# Patient Record
Sex: MALE
[De-identification: names, ages, dates, MRNs, and addresses within clinical notes are randomized per-mention and may not be internally consistent; named-entity substitution may affect disease eponyms.]

---

## 2024-06-20 ENCOUNTER — DASHBOARD ENCOUNTERS (OUTPATIENT)
Age: 29
End: 2024-06-20

## 2024-06-20 ENCOUNTER — LAB OUTSIDE AN ENCOUNTER (OUTPATIENT)
Dept: URBAN - METROPOLITAN AREA CLINIC 82 | Facility: CLINIC | Age: 29
End: 2024-06-20

## 2024-06-20 ENCOUNTER — OFFICE VISIT (OUTPATIENT)
Dept: URBAN - METROPOLITAN AREA CLINIC 82 | Facility: CLINIC | Age: 29
End: 2024-06-20

## 2024-06-20 VITALS
HEIGHT: 67 IN | WEIGHT: 114.8 LBS | TEMPERATURE: 98.4 F | HEART RATE: 81 BPM | BODY MASS INDEX: 18.02 KG/M2 | DIASTOLIC BLOOD PRESSURE: 62 MMHG | SYSTOLIC BLOOD PRESSURE: 95 MMHG

## 2024-06-20 PROBLEM — 56689002: Status: ACTIVE | Noted: 2024-06-20

## 2024-06-20 NOTE — HPI-TODAY'S VISIT:
29 y/o white male presents to establish care for Crohn's disease. First diagnosed age 9.  Fever, diarhrea, pain were initial symptoms.   Sulfasalazine. Remicade 6 years, transition to humira due to insurance and convenience. Humira X 6 yeras then     Was on for 6 years until insurance lapsed.  Now having diarrhea daily for past 6 months.  Watery loose stools 3 or 4 times per day.  Abdominal pain before diarrhea. Lost over 20 pounds in past 6 months,  Eating normally.   Last colonoscopy 6 years ago, was in remission. Always small intestine.  Thalemsemia trait, never an issue.

## 2024-06-25 LAB
A/G RATIO: 1.2
ALBUMIN: 3.2
ALKALINE PHOSPHATASE: 63
ALT (SGPT): 9
AST (SGOT): 11
BILIRUBIN, TOTAL: 0.3
BUN/CREATININE RATIO: (no result)
BUN: 9
C-REACTIVE PROTEIN, QUANT: 114
CALCIUM: 8.6
CARBON DIOXIDE, TOTAL: 27
CHLORIDE: 102
CREATININE: 0.79
EGFR: 126
GLOBULIN, TOTAL: 2.7
GLUCOSE: 83
HEMATOCRIT: 40.5
HEMOGLOBIN: 13.2
MCH: 26.5
MCHC: 32.6
MCV: 81.2
MITOGEN-NIL: 5.33
MPV: 8.3
PLATELET COUNT: 398
POTASSIUM: 4.4
PROTEIN, TOTAL: 5.9
QUANTIFERON NIL VALUE: 0.13
QUANTIFERON TB1 AG VALUE: 0.01
QUANTIFERON TB2 AG VALUE: 0
QUANTIFERON-TB GOLD PLUS: NEGATIVE
RDW: 12.3
RED BLOOD CELL COUNT: 4.99
SODIUM: 139
WHITE BLOOD CELL COUNT: 6.2

## 2024-07-11 ENCOUNTER — LAB OUTSIDE AN ENCOUNTER (OUTPATIENT)
Dept: URBAN - METROPOLITAN AREA CLINIC 115 | Facility: CLINIC | Age: 29
End: 2024-07-11

## 2024-07-14 LAB
ADENOVIRUS F 40/41: NOT DETECTED
CALPROTECTIN, STOOL - QDX: (no result)
CAMPYLOBACTER: NOT DETECTED
CLOSTRIDIUM DIFFICILE: NOT DETECTED
ENTAMOEBA HISTOLYTICA: NOT DETECTED
ENTEROAGGREGATIVE E.COLI: NOT DETECTED
ENTEROTOXIGENIC E.COLI: NOT DETECTED
GIARDIA LAMBLIA: NOT DETECTED
NOROVIRUS GI/GII: NOT DETECTED
ROTAVIRUS A: NOT DETECTED
SHIGA-LIKE TOXIN PRODUCING E.COLI: NOT DETECTED
SHIGA-LIKE TOXIN PRODUCING E.COLI: NOT DETECTED
VIBRIO CHOLERAE: NOT DETECTED
VIBRIO SPP.: NOT DETECTED
YERSINIA ENTEROCOLITICA: NOT DETECTED
YERSINIA ENTEROCOLITICA: NOT DETECTED

## 2024-07-23 ENCOUNTER — OFFICE VISIT (OUTPATIENT)
Dept: URBAN - METROPOLITAN AREA SURGERY CENTER 13 | Facility: SURGERY CENTER | Age: 29
End: 2024-07-23

## 2024-07-25 ENCOUNTER — CLAIMS CREATED FROM THE CLAIM WINDOW (OUTPATIENT)
Dept: URBAN - METROPOLITAN AREA CLINIC 4 | Facility: CLINIC | Age: 29
End: 2024-07-25
Payer: COMMERCIAL

## 2024-07-25 ENCOUNTER — CLAIMS CREATED FROM THE CLAIM WINDOW (OUTPATIENT)
Dept: URBAN - METROPOLITAN AREA SURGERY CENTER 13 | Facility: SURGERY CENTER | Age: 29
End: 2024-07-25
Payer: COMMERCIAL

## 2024-07-25 ENCOUNTER — OFFICE VISIT (OUTPATIENT)
Dept: URBAN - METROPOLITAN AREA SURGERY CENTER 13 | Facility: SURGERY CENTER | Age: 29
End: 2024-07-25

## 2024-07-25 DIAGNOSIS — K52.89 OTHER SPECIFIED NONINFECTIVE GASTROENTERITIS AND COLITIS: ICD-10-CM

## 2024-07-25 DIAGNOSIS — Z86.010 ADENOMAS PERSONAL HISTORY OF COLONIC POLYPS: ICD-10-CM

## 2024-07-25 DIAGNOSIS — K31.89 OTHER DISEASES OF STOMACH AND DUODENUM: ICD-10-CM

## 2024-07-25 DIAGNOSIS — Z12.11 COLON CANCER SCREENING: ICD-10-CM

## 2024-07-25 DIAGNOSIS — K29.81 DUODENITIS WITH BLEEDING: ICD-10-CM

## 2024-07-25 PROCEDURE — 00813 ANES UPR LWR GI NDSC PX: CPT | Performed by: ANESTHESIOLOGIST ASSISTANT

## 2024-07-25 PROCEDURE — 88305 TISSUE EXAM BY PATHOLOGIST: CPT | Performed by: PATHOLOGY

## 2024-07-25 PROCEDURE — 00813 ANES UPR LWR GI NDSC PX: CPT | Performed by: ANESTHESIOLOGY

## 2024-07-25 PROCEDURE — 88312 SPECIAL STAINS GROUP 1: CPT | Performed by: PATHOLOGY

## 2024-07-31 ENCOUNTER — OFFICE VISIT (OUTPATIENT)
Dept: URBAN - METROPOLITAN AREA CLINIC 82 | Facility: CLINIC | Age: 29
End: 2024-07-31

## 2024-07-31 VITALS
BODY MASS INDEX: 18.52 KG/M2 | TEMPERATURE: 98.3 F | WEIGHT: 118 LBS | SYSTOLIC BLOOD PRESSURE: 102 MMHG | HEIGHT: 67 IN | DIASTOLIC BLOOD PRESSURE: 69 MMHG

## 2024-07-31 RX ORDER — RISANKIZUMAB-RZAA 180 MG/1.2
1.2 ML KIT SUBCUTANEOUS
Qty: 1 | Refills: 6 | OUTPATIENT
Start: 2024-07-31 | End: 2025-08-27

## 2024-07-31 RX ORDER — RISANKIZUMAB-RZAA 60 MG/ML
AS DIRECTED INJECTION INTRAVENOUS
Qty: 600 | Refills: 0 | OUTPATIENT
Start: 2024-07-31

## 2024-07-31 NOTE — HPI-TODAY'S VISIT:
27 y/o white male presents in follow up after staging for Crohn's disease. First diagnosed age 9.  Fever, diarhrea, pain were initial symptoms.  Currently having diarrhea, abdo pain and weight loss.  Sulfasalazine. Remicade 6 years, transition to humira due to insurance and convenience. Humira X 6 years then insurance lapsed.    EGD found gastritis as well as scalloping in duodenum.  Bxp pending. Colon found signficiant edema and nodular mucosa in cecum, Bxp pending.  Unabe to intubate TI.  CT found long segment of active inflammation in distal ileum, no absces or fistula.    CRP elevated.

## 2024-08-02 ENCOUNTER — TELEPHONE ENCOUNTER (OUTPATIENT)
Dept: URBAN - METROPOLITAN AREA CLINIC 82 | Facility: CLINIC | Age: 29
End: 2024-08-02

## 2024-08-08 ENCOUNTER — TELEPHONE ENCOUNTER (OUTPATIENT)
Dept: URBAN - METROPOLITAN AREA CLINIC 82 | Facility: CLINIC | Age: 29
End: 2024-08-08

## 2024-08-16 ENCOUNTER — OFFICE VISIT (OUTPATIENT)
Dept: URBAN - METROPOLITAN AREA SURGERY CENTER 13 | Facility: SURGERY CENTER | Age: 29
End: 2024-08-16

## 2024-09-09 ENCOUNTER — OFFICE VISIT (OUTPATIENT)
Dept: URBAN - METROPOLITAN AREA CLINIC 97 | Facility: CLINIC | Age: 29
End: 2024-09-09
Payer: COMMERCIAL

## 2024-09-09 VITALS
HEART RATE: 75 BPM | RESPIRATION RATE: 18 BRPM | HEIGHT: 67 IN | WEIGHT: 116 LBS | DIASTOLIC BLOOD PRESSURE: 80 MMHG | TEMPERATURE: 98.3 F | SYSTOLIC BLOOD PRESSURE: 134 MMHG | BODY MASS INDEX: 18.21 KG/M2

## 2024-09-09 DIAGNOSIS — K50.80 CROHN'S COLITIS: ICD-10-CM

## 2024-09-09 PROCEDURE — 96413 CHEMO IV INFUSION 1 HR: CPT | Performed by: INTERNAL MEDICINE

## 2024-09-09 RX ORDER — RISANKIZUMAB-RZAA 180 MG/1.2
1.2 ML KIT SUBCUTANEOUS
Qty: 1 | Refills: 6 | Status: ACTIVE | COMMUNITY
Start: 2024-07-31 | End: 2025-08-27

## 2024-09-09 RX ORDER — RISANKIZUMAB-RZAA 60 MG/ML
AS DIRECTED INJECTION INTRAVENOUS
Qty: 600 | Refills: 0 | Status: ACTIVE | COMMUNITY
Start: 2024-07-31

## 2024-10-07 ENCOUNTER — OFFICE VISIT (OUTPATIENT)
Dept: URBAN - METROPOLITAN AREA CLINIC 97 | Facility: CLINIC | Age: 29
End: 2024-10-07
Payer: COMMERCIAL

## 2024-10-07 VITALS
WEIGHT: 128 LBS | TEMPERATURE: 98.1 F | BODY MASS INDEX: 20.09 KG/M2 | RESPIRATION RATE: 18 BRPM | DIASTOLIC BLOOD PRESSURE: 80 MMHG | SYSTOLIC BLOOD PRESSURE: 137 MMHG | HEART RATE: 65 BPM | HEIGHT: 67 IN

## 2024-10-07 DIAGNOSIS — K50.90 CROHN'S DISEASE: ICD-10-CM

## 2024-10-07 PROCEDURE — 96413 CHEMO IV INFUSION 1 HR: CPT | Performed by: INTERNAL MEDICINE

## 2024-10-07 RX ORDER — RISANKIZUMAB-RZAA 60 MG/ML
AS DIRECTED INJECTION INTRAVENOUS
Qty: 600 | Refills: 0 | Status: ACTIVE | COMMUNITY
Start: 2024-07-31

## 2024-10-07 RX ORDER — RISANKIZUMAB-RZAA 180 MG/1.2
1.2 ML KIT SUBCUTANEOUS
Qty: 1 | Refills: 6 | Status: ACTIVE | COMMUNITY
Start: 2024-07-31 | End: 2025-08-27

## 2024-10-09 ENCOUNTER — OFFICE VISIT (OUTPATIENT)
Dept: URBAN - METROPOLITAN AREA CLINIC 82 | Facility: CLINIC | Age: 29
End: 2024-10-09
Payer: COMMERCIAL

## 2024-10-09 VITALS
HEART RATE: 61 BPM | SYSTOLIC BLOOD PRESSURE: 109 MMHG | WEIGHT: 120.4 LBS | BODY MASS INDEX: 18.9 KG/M2 | DIASTOLIC BLOOD PRESSURE: 66 MMHG | TEMPERATURE: 97.2 F | HEIGHT: 67 IN

## 2024-10-09 DIAGNOSIS — R63.4 ABNORMAL WEIGHT LOSS: ICD-10-CM

## 2024-10-09 DIAGNOSIS — R19.7 ACUTE DIARRHEA: ICD-10-CM

## 2024-10-09 DIAGNOSIS — R10.31 RLQ ABDOMINAL PAIN: ICD-10-CM

## 2024-10-09 DIAGNOSIS — K50.00 CROHN'S DISEASE OF SMALL INTESTINE WITHOUT COMPLICATION: ICD-10-CM

## 2024-10-09 PROCEDURE — 99214 OFFICE O/P EST MOD 30 MIN: CPT | Performed by: INTERNAL MEDICINE

## 2024-10-09 RX ORDER — RISANKIZUMAB-RZAA 60 MG/ML
AS DIRECTED INJECTION INTRAVENOUS
Qty: 600 | Refills: 0 | Status: ACTIVE | COMMUNITY
Start: 2024-07-31

## 2024-10-09 RX ORDER — RISANKIZUMAB-RZAA 180 MG/1.2
1.2 ML KIT SUBCUTANEOUS
Qty: 1 | Refills: 6 | Status: ACTIVE | COMMUNITY
Start: 2024-07-31 | End: 2025-08-27

## 2024-10-09 NOTE — HPI-TODAY'S VISIT:
29 y/o white male presents in follow up of Crohn's disease. First diagnosed age 9.  Fever, diarhrea, pain were initial symptoms.  Treatment hx includes Sulfasalazine. Remicade 6 years, transition to humira due to insurance and convenience. Humira X 6 years then insurance lapsed.    EGD found gastritis as well as scalloping in duodenum.  Bxp = peptic duodenitis. Colon found signficiant edema and nodular mucosa in cecum, Bxp = focal active colitis. Unabe to intubate TI.  CT found long segment of active inflammation in distal ileum, no absces or fistula.    CRP elevated.  After discussing options he opted for Skyrizi.  He has had 2 induction infusions thus far.  3rd is scheduled.  Has noticed some improvement.  Less pain.  Having some stools more formed.  Has gained 2 pounds since last office visit.

## 2024-11-04 ENCOUNTER — OFFICE VISIT (OUTPATIENT)
Dept: URBAN - METROPOLITAN AREA CLINIC 97 | Facility: CLINIC | Age: 29
End: 2024-11-04

## 2024-11-15 ENCOUNTER — OFFICE VISIT (OUTPATIENT)
Dept: URBAN - METROPOLITAN AREA CLINIC 97 | Facility: CLINIC | Age: 29
End: 2024-11-15
Payer: COMMERCIAL

## 2024-11-15 VITALS
DIASTOLIC BLOOD PRESSURE: 82 MMHG | BODY MASS INDEX: 19.46 KG/M2 | HEART RATE: 71 BPM | TEMPERATURE: 98.5 F | SYSTOLIC BLOOD PRESSURE: 134 MMHG | RESPIRATION RATE: 20 BRPM | HEIGHT: 67 IN | WEIGHT: 124 LBS

## 2024-11-15 DIAGNOSIS — K50.90 CROHN'S DISEASE: ICD-10-CM

## 2024-11-15 PROCEDURE — 96413 CHEMO IV INFUSION 1 HR: CPT | Performed by: INTERNAL MEDICINE

## 2024-11-15 RX ORDER — RISANKIZUMAB-RZAA 180 MG/1.2
1.2 ML KIT SUBCUTANEOUS
Qty: 1 | Refills: 6 | Status: ACTIVE | COMMUNITY
Start: 2024-07-31 | End: 2025-08-27

## 2024-11-15 RX ORDER — RISANKIZUMAB-RZAA 60 MG/ML
AS DIRECTED INJECTION INTRAVENOUS
Qty: 600 | Refills: 0 | Status: ACTIVE | COMMUNITY
Start: 2024-07-31

## 2025-01-08 ENCOUNTER — OFFICE VISIT (OUTPATIENT)
Dept: URBAN - METROPOLITAN AREA CLINIC 82 | Facility: CLINIC | Age: 30
End: 2025-01-08

## 2025-02-24 ENCOUNTER — TELEPHONE ENCOUNTER (OUTPATIENT)
Dept: URBAN - METROPOLITAN AREA CLINIC 82 | Facility: CLINIC | Age: 30
End: 2025-02-24

## 2025-02-24 ENCOUNTER — TELEPHONE ENCOUNTER (OUTPATIENT)
Dept: URBAN - METROPOLITAN AREA CLINIC 115 | Facility: CLINIC | Age: 30
End: 2025-02-24

## 2025-02-26 ENCOUNTER — OFFICE VISIT (OUTPATIENT)
Dept: URBAN - METROPOLITAN AREA CLINIC 82 | Facility: CLINIC | Age: 30
End: 2025-02-26
Payer: COMMERCIAL

## 2025-02-26 VITALS
HEIGHT: 67 IN | HEART RATE: 63 BPM | BODY MASS INDEX: 19.43 KG/M2 | WEIGHT: 123.8 LBS | SYSTOLIC BLOOD PRESSURE: 99 MMHG | DIASTOLIC BLOOD PRESSURE: 66 MMHG | TEMPERATURE: 96.6 F

## 2025-02-26 DIAGNOSIS — R63.4 ABNORMAL WEIGHT LOSS: ICD-10-CM

## 2025-02-26 DIAGNOSIS — K50.00 CROHN'S DISEASE OF SMALL INTESTINE WITHOUT COMPLICATION: ICD-10-CM

## 2025-02-26 DIAGNOSIS — R10.31 RLQ ABDOMINAL PAIN: ICD-10-CM

## 2025-02-26 PROCEDURE — 99213 OFFICE O/P EST LOW 20 MIN: CPT | Performed by: INTERNAL MEDICINE

## 2025-02-26 RX ORDER — RISANKIZUMAB-RZAA 60 MG/ML
AS DIRECTED INJECTION INTRAVENOUS
Qty: 600 | Refills: 0 | Status: ACTIVE | COMMUNITY
Start: 2024-07-31

## 2025-02-26 RX ORDER — RISANKIZUMAB-RZAA 180 MG/1.2
1.2 ML KIT SUBCUTANEOUS
Qty: 1 | Refills: 6 | Status: ACTIVE | COMMUNITY
Start: 2024-07-31 | End: 2025-08-27

## 2025-02-26 NOTE — HPI-TODAY'S VISIT:
30 y/o white male presents in follow up of Crohn's disease. First diagnosed age 9.  Fever, diarrhea, pain were initial symptoms.  Treatment hx includes Sulfasalazine. Remicade 6 years, transition to humira due to insurance and convenience. Humira X 6 years then insurance lapsed.    EGD found gastritis as well as scalloping in duodenum.  Bxp = peptic duodenitis. Colon found signficiant edema and nodular mucosa in cecum, Bxp = focal active colitis. Unabe to intubate TI.  CT found long segment of active inflammation in distal ileum, no absces or fistula.    CRP elevated.  He is now on Skyrizi.  Completed induction.  On home injections. Improving.  Pain resolved.  Had symptoms of virus last week, had twinge of pain that was self limited. [employer] Pregnancy And Lactation Text: The risk during pregnancy and breastfeeding is uncertain with this medication.

## 2025-03-21 ENCOUNTER — CLAIMS CREATED FROM THE CLAIM WINDOW (OUTPATIENT)
Dept: URBAN - METROPOLITAN AREA MEDICAL CENTER 24 | Facility: MEDICAL CENTER | Age: 30
End: 2025-03-21

## 2025-03-21 PROCEDURE — 99254 IP/OBS CNSLTJ NEW/EST MOD 60: CPT | Performed by: PHYSICIAN ASSISTANT

## 2025-03-22 ENCOUNTER — CLAIMS CREATED FROM THE CLAIM WINDOW (OUTPATIENT)
Dept: URBAN - METROPOLITAN AREA MEDICAL CENTER 24 | Facility: MEDICAL CENTER | Age: 30
End: 2025-03-22

## 2025-03-22 PROCEDURE — 99232 SBSQ HOSP IP/OBS MODERATE 35: CPT

## 2025-03-23 ENCOUNTER — CLAIMS CREATED FROM THE CLAIM WINDOW (OUTPATIENT)
Dept: URBAN - METROPOLITAN AREA MEDICAL CENTER 24 | Facility: MEDICAL CENTER | Age: 30
End: 2025-03-23

## 2025-03-23 PROCEDURE — 99232 SBSQ HOSP IP/OBS MODERATE 35: CPT

## 2025-03-24 ENCOUNTER — CLAIMS CREATED FROM THE CLAIM WINDOW (OUTPATIENT)
Dept: URBAN - METROPOLITAN AREA MEDICAL CENTER 24 | Facility: MEDICAL CENTER | Age: 30
End: 2025-03-24

## 2025-03-24 PROCEDURE — 99232 SBSQ HOSP IP/OBS MODERATE 35: CPT | Performed by: PHYSICIAN ASSISTANT

## 2025-03-25 ENCOUNTER — CLAIMS CREATED FROM THE CLAIM WINDOW (OUTPATIENT)
Dept: URBAN - METROPOLITAN AREA MEDICAL CENTER 24 | Facility: MEDICAL CENTER | Age: 30
End: 2025-03-25

## 2025-03-25 PROCEDURE — 99232 SBSQ HOSP IP/OBS MODERATE 35: CPT | Performed by: PHYSICIAN ASSISTANT

## 2025-03-28 ENCOUNTER — WEB ENCOUNTER (OUTPATIENT)
Dept: URBAN - METROPOLITAN AREA CLINIC 82 | Facility: CLINIC | Age: 30
End: 2025-03-28

## 2025-04-02 ENCOUNTER — TELEPHONE ENCOUNTER (OUTPATIENT)
Dept: URBAN - METROPOLITAN AREA CLINIC 82 | Facility: CLINIC | Age: 30
End: 2025-04-02

## 2025-04-18 ENCOUNTER — TELEPHONE ENCOUNTER (OUTPATIENT)
Dept: URBAN - METROPOLITAN AREA CLINIC 82 | Facility: CLINIC | Age: 30
End: 2025-04-18

## 2025-04-19 ENCOUNTER — CLAIMS CREATED FROM THE CLAIM WINDOW (OUTPATIENT)
Dept: URBAN - METROPOLITAN AREA MEDICAL CENTER 24 | Facility: MEDICAL CENTER | Age: 30
End: 2025-04-19
Payer: COMMERCIAL

## 2025-04-19 DIAGNOSIS — D72.829 LEUKOCYTOSIS: ICD-10-CM

## 2025-04-19 DIAGNOSIS — K50.012 CROHN'S DISEASE OF DUODENUM WITH INTESTINAL OBSTRUCTION: ICD-10-CM

## 2025-04-19 PROCEDURE — 99254 IP/OBS CNSLTJ NEW/EST MOD 60: CPT | Performed by: INTERNAL MEDICINE

## 2025-04-19 PROCEDURE — 99222 1ST HOSP IP/OBS MODERATE 55: CPT | Performed by: INTERNAL MEDICINE

## 2025-04-19 PROCEDURE — G8427 DOCREV CUR MEDS BY ELIG CLIN: HCPCS | Performed by: INTERNAL MEDICINE

## 2025-04-20 ENCOUNTER — CLAIMS CREATED FROM THE CLAIM WINDOW (OUTPATIENT)
Dept: URBAN - METROPOLITAN AREA MEDICAL CENTER 24 | Facility: MEDICAL CENTER | Age: 30
End: 2025-04-20
Payer: COMMERCIAL

## 2025-04-20 DIAGNOSIS — K50.012 CROHN'S DISEASE OF DUODENUM WITH INTESTINAL OBSTRUCTION: ICD-10-CM

## 2025-04-20 DIAGNOSIS — D72.829 LEUKOCYTOSIS: ICD-10-CM

## 2025-04-20 PROCEDURE — 99232 SBSQ HOSP IP/OBS MODERATE 35: CPT | Performed by: INTERNAL MEDICINE

## 2025-04-21 ENCOUNTER — CLAIMS CREATED FROM THE CLAIM WINDOW (OUTPATIENT)
Dept: URBAN - METROPOLITAN AREA MEDICAL CENTER 24 | Facility: MEDICAL CENTER | Age: 30
End: 2025-04-21

## 2025-04-21 PROCEDURE — 99232 SBSQ HOSP IP/OBS MODERATE 35: CPT

## 2025-04-23 ENCOUNTER — OFFICE VISIT (OUTPATIENT)
Dept: URBAN - METROPOLITAN AREA CLINIC 82 | Facility: CLINIC | Age: 30
End: 2025-04-23
Payer: COMMERCIAL

## 2025-04-23 DIAGNOSIS — R10.31 RLQ ABDOMINAL PAIN: ICD-10-CM

## 2025-04-23 DIAGNOSIS — R63.4 ABNORMAL WEIGHT LOSS: ICD-10-CM

## 2025-04-23 DIAGNOSIS — K50.00 CROHN'S DISEASE OF SMALL INTESTINE WITHOUT COMPLICATION: ICD-10-CM

## 2025-04-23 PROCEDURE — 99214 OFFICE O/P EST MOD 30 MIN: CPT | Performed by: INTERNAL MEDICINE

## 2025-04-23 RX ORDER — RISANKIZUMAB-RZAA 60 MG/ML
AS DIRECTED INJECTION INTRAVENOUS
Qty: 600 | Refills: 0 | Status: ACTIVE | COMMUNITY
Start: 2024-07-31

## 2025-04-23 RX ORDER — RISANKIZUMAB-RZAA 180 MG/1.2
1.2 ML KIT SUBCUTANEOUS
Qty: 1 | Refills: 6 | Status: ACTIVE | COMMUNITY
Start: 2024-07-31 | End: 2025-08-27

## 2025-04-23 NOTE — HPI-TODAY'S VISIT:
28 y/o white male presents in hospital follow up for Crohn's disease. First diagnosed age 9.  Fever, diarrhea, pain were initial symptoms.  Treatment hx includes Sulfasalazine. Remicade 6 years, transition to humira due to insurance and convenience. Humira X 6 years then insurance lapsed.    EGD found gastritis as well as scalloping in duodenum.  Bxp = peptic duodenitis. Colon found signficiant edema and nodular mucosa in cecum, Bxp = focal active colitis. Unabe to intubate TI.  CT found long segment of active inflammation in distal ileum, no absces or fistula.    CRP elevated.  He is now on Skyrizi.  Completed induction.  On home injections. Was improving.  CRP was decreasing.  Had admission 3/21/25 to 3/25/25 for Crohn's flare.  Long segment inflammation resulting in partial SBO.  IMproved with steroids. Another admission 4/19/25 to 4/21/25 for relapse  After the 1st discharge he was taking only 10mg prednisone for 1 week, then after clarifying instuctions went to 40mg daily. He was only taking 10mg prednisone daily instead of the taper dose advised.  No BM since discharge but is passing gas and is without abdominal pain.

## 2025-05-28 ENCOUNTER — OFFICE VISIT (OUTPATIENT)
Dept: URBAN - METROPOLITAN AREA CLINIC 82 | Facility: CLINIC | Age: 30
End: 2025-05-28
Payer: COMMERCIAL

## 2025-05-28 ENCOUNTER — OFFICE VISIT (OUTPATIENT)
Dept: URBAN - METROPOLITAN AREA CLINIC 82 | Facility: CLINIC | Age: 30
End: 2025-05-28

## 2025-05-28 DIAGNOSIS — K50.00 CROHN'S DISEASE OF SMALL INTESTINE WITHOUT COMPLICATION: ICD-10-CM

## 2025-05-28 DIAGNOSIS — R63.4 ABNORMAL WEIGHT LOSS: ICD-10-CM

## 2025-05-28 PROCEDURE — 99213 OFFICE O/P EST LOW 20 MIN: CPT | Performed by: INTERNAL MEDICINE

## 2025-05-28 RX ORDER — RISANKIZUMAB-RZAA 180 MG/1.2
1.2 ML KIT SUBCUTANEOUS
Qty: 1 | Refills: 6 | Status: ACTIVE | COMMUNITY
Start: 2024-07-31 | End: 2025-08-27

## 2025-05-28 RX ORDER — RISANKIZUMAB-RZAA 60 MG/ML
AS DIRECTED INJECTION INTRAVENOUS
Qty: 600 | Refills: 0 | Status: ACTIVE | COMMUNITY
Start: 2024-07-31

## 2025-05-28 NOTE — HPI-TODAY'S VISIT:
28 y/o white male presents in hospital follow up for Crohn's disease. First diagnosed age 9.  Fever, diarrhea, pain were initial symptoms.  Treatment hx includes Sulfasalazine. Remicade 6 years, transition to humira due to insurance and convenience. Humira X 6 years then insurance lapsed.    EGD found gastritis as well as scalloping in duodenum.  Bxp = peptic duodenitis. Colon found signficiant edema and nodular mucosa in cecum, Bxp = focal active colitis. Unabe to intubate TI.  CT found long segment of active inflammation in distal ileum, no absces or fistula.    CRP elevated.  He is now on Skyrizi.  Completed induction.  On home injections. Was improving.  CRP was decreasing.  Had admission 3/21/25 to 3/25/25 for Crohn's flare.  Long segment inflammation resulting in partial SBO.  IMproved with steroids. Another admission 4/19/25 to 4/21/25 for relapse  After the 1st discharge he was taking only 10mg prednisone for 1 week, then after clarifying instuctions went to 40mg daily. He was only taking 10mg prednisone daily instead of the taper dose advised.  Last visit 1 month ago, post hospital.  Decided on long steroid taper. Today he reports is down to 20mg dose. Feels very well.  No pain, no diarrhea.

## 2025-05-29 ENCOUNTER — TELEPHONE ENCOUNTER (OUTPATIENT)
Dept: URBAN - METROPOLITAN AREA CLINIC 115 | Facility: CLINIC | Age: 30
End: 2025-05-29

## 2025-05-29 LAB — C-REACTIVE PROTEIN, QUANT: <3

## 2025-08-04 ENCOUNTER — OFFICE VISIT (OUTPATIENT)
Dept: URBAN - METROPOLITAN AREA CLINIC 115 | Facility: CLINIC | Age: 30
End: 2025-08-04
Payer: COMMERCIAL

## 2025-08-04 DIAGNOSIS — K50.00 CROHN'S DISEASE OF SMALL INTESTINE WITHOUT COMPLICATION: ICD-10-CM

## 2025-08-04 PROCEDURE — 99213 OFFICE O/P EST LOW 20 MIN: CPT | Performed by: INTERNAL MEDICINE

## 2025-08-04 RX ORDER — RISANKIZUMAB-RZAA 60 MG/ML
AS DIRECTED INJECTION INTRAVENOUS
Qty: 600 | Refills: 0 | Status: ACTIVE | COMMUNITY
Start: 2024-07-31

## 2025-08-04 RX ORDER — RISANKIZUMAB-RZAA 180 MG/1.2
1.2 ML KIT SUBCUTANEOUS
Qty: 1 | Refills: 6 | Status: ACTIVE | COMMUNITY
Start: 2024-07-31 | End: 2025-08-27

## 2025-08-05 ENCOUNTER — TELEPHONE ENCOUNTER (OUTPATIENT)
Dept: URBAN - METROPOLITAN AREA CLINIC 115 | Facility: CLINIC | Age: 30
End: 2025-08-05

## 2025-08-05 LAB
A/G RATIO: 1.9
ALBUMIN: 4.3
ALKALINE PHOSPHATASE: 53
ALT (SGPT): 8
AST (SGOT): 11
BILIRUBIN, TOTAL: 0.5
BUN/CREATININE RATIO: (no result)
BUN: 11
C-REACTIVE PROTEIN, QUANT: 6.6
CALCIUM: 9.4
CARBON DIOXIDE, TOTAL: 30
CHLORIDE: 104
CREATININE: 0.78
EGFR: 125
GLOBULIN, TOTAL: 2.3
GLUCOSE: 76
HEMATOCRIT: 44.3
HEMOGLOBIN: 14.7
MCH: 28.8
MCHC: 33.2
MCV: 86.9
MPV: 9.4
PLATELET COUNT: 274
POTASSIUM: 5.2
PROTEIN, TOTAL: 6.6
RDW: 12.6
RED BLOOD CELL COUNT: 5.1
SODIUM: 140
WHITE BLOOD CELL COUNT: 7.8